# Patient Record
Sex: FEMALE | Race: WHITE | NOT HISPANIC OR LATINO | URBAN - NONMETROPOLITAN AREA
[De-identification: names, ages, dates, MRNs, and addresses within clinical notes are randomized per-mention and may not be internally consistent; named-entity substitution may affect disease eponyms.]

---

## 2017-09-01 PROBLEM — H26.493: Noted: 2017-09-01

## 2019-04-25 ENCOUNTER — IMPORTED ENCOUNTER (OUTPATIENT)
Dept: URBAN - NONMETROPOLITAN AREA CLINIC 1 | Facility: CLINIC | Age: 64
End: 2019-04-25

## 2019-04-25 PROCEDURE — 92012 INTRM OPH EXAM EST PATIENT: CPT

## 2019-04-25 PROCEDURE — 92133 CPTRZD OPH DX IMG PST SGM ON: CPT

## 2019-04-25 NOTE — PATIENT DISCUSSION
COAG-Appears stable. -IOP TODAY STBLE-Continue Lumigan as directed. Stressed importance of compliance. -CONSIDER TX CHANGE FOR OS AFTER VF CHANGESPseudophakic OUMONITOREarly PCO OU-Explained symptoms of advancing PCO. -Continue to monitor for now. Pt will notify us if any new symptoms develop.

## 2019-08-29 ENCOUNTER — IMPORTED ENCOUNTER (OUTPATIENT)
Dept: URBAN - NONMETROPOLITAN AREA CLINIC 1 | Facility: CLINIC | Age: 64
End: 2019-08-29

## 2019-08-29 PROCEDURE — 92012 INTRM OPH EXAM EST PATIENT: CPT

## 2020-02-27 ENCOUNTER — IMPORTED ENCOUNTER (OUTPATIENT)
Dept: URBAN - NONMETROPOLITAN AREA CLINIC 1 | Facility: CLINIC | Age: 65
End: 2020-02-27

## 2020-02-27 PROCEDURE — 92083 EXTENDED VISUAL FIELD XM: CPT

## 2020-02-27 PROCEDURE — 92012 INTRM OPH EXAM EST PATIENT: CPT

## 2020-02-27 NOTE — PATIENT DISCUSSION
COAG-Appears stable. -IOP TODAY STBLE-Continue Lumigan as directed. Stressed importance of compliance.-vf today stable ouPseudophakic OUMONITOREarly PCO OU-Explained symptoms of advancing PCO. -Continue to monitor for now. Pt will notify us if any new symptoms develop.

## 2020-07-02 ENCOUNTER — IMPORTED ENCOUNTER (OUTPATIENT)
Dept: URBAN - NONMETROPOLITAN AREA CLINIC 1 | Facility: CLINIC | Age: 65
End: 2020-07-02

## 2020-07-02 PROCEDURE — 92133 CPTRZD OPH DX IMG PST SGM ON: CPT

## 2020-09-28 ENCOUNTER — IMPORTED ENCOUNTER (OUTPATIENT)
Dept: URBAN - NONMETROPOLITAN AREA CLINIC 1 | Facility: CLINIC | Age: 65
End: 2020-09-28

## 2020-09-28 PROCEDURE — 92012 INTRM OPH EXAM EST PATIENT: CPT

## 2020-09-28 NOTE — PATIENT DISCUSSION
COAG-IOP TODAY STBLE-Continue Lumigan as directed. - Stressed importance of compliance. Pseudophakic OUMONITOR FOR PCOEarly PCO OU-Explained symptoms of advancing PCO. -Continue to monitor for now. Pt will notify us if any new symptoms develop.

## 2021-03-23 ENCOUNTER — IMPORTED ENCOUNTER (OUTPATIENT)
Dept: URBAN - NONMETROPOLITAN AREA CLINIC 1 | Facility: CLINIC | Age: 66
End: 2021-03-23

## 2021-03-23 PROCEDURE — 92012 INTRM OPH EXAM EST PATIENT: CPT

## 2021-03-23 NOTE — PATIENT DISCUSSION
COAG-IOP TODAY STBLE-vf loss os possible to hx of several trauma's-Continue Lumigan as directed. - Stressed importance of compliance. -monitor for iop and vf changesPseudophakic OUMONITOR FOR PCOEarly PCO OU-Explained symptoms of advancing PCO. -Continue to monitor for now. Pt will notify us if any new symptoms develop. ptosis oseducate ptrefer to dr.s martinez for consult

## 2021-04-13 NOTE — PATIENT DISCUSSION
Glasses Rx given. I called patient and she is not looking for occipital nerve block at this time.  She is hoping to discuss further treatment options to decrease frequency of headaches.       Appointment scheduled for 7:30 tomorrow.

## 2021-04-13 NOTE — PATIENT DISCUSSION
The patient was informed that with [address] [facility] for distance, they will need glasses for all near and intermediate activities after surgery. The patient understands there is a possibility they may need an enhancement after surgery. The patient elects [employer] OS, goal of emmetropia.

## 2021-04-29 NOTE — PATIENT DISCUSSION
Patient currently at lab and states no order for UA/UC. Placed order per Dr. Honey Ponce. Reassured patient.

## 2021-04-30 NOTE — PATIENT DISCUSSION
Cardiology RD/tear warning symptoms reviewed. F&F brochure given. Call immediately if increase in floaters, flashes, veil, blur.

## 2021-05-08 NOTE — PATIENT DISCUSSION
Reassured patient. Hydroxyzine Counseling: Patient advised that the medication is sedating and not to drive a car after taking this medication.  Patient informed of potential adverse effects including but not limited to dry mouth, urinary retention, and blurry vision.  The patient verbalized understanding of the proper use and possible adverse effects of hydroxyzine.  All of the patient's questions and concerns were addressed.

## 2021-06-23 ENCOUNTER — IMPORTED ENCOUNTER (OUTPATIENT)
Dept: URBAN - NONMETROPOLITAN AREA CLINIC 1 | Facility: CLINIC | Age: 66
End: 2021-06-23

## 2021-06-23 PROCEDURE — 92012 INTRM OPH EXAM EST PATIENT: CPT

## 2021-08-23 ENCOUNTER — IMPORTED ENCOUNTER (OUTPATIENT)
Dept: URBAN - NONMETROPOLITAN AREA CLINIC 1 | Facility: CLINIC | Age: 66
End: 2021-08-23

## 2021-08-23 PROCEDURE — 92133 CPTRZD OPH DX IMG PST SGM ON: CPT

## 2021-08-23 PROCEDURE — 99213 OFFICE O/P EST LOW 20 MIN: CPT

## 2021-08-23 NOTE — PATIENT DISCUSSION
COAG-IOP TODAY STBLE-vf loss os possible to hx of several trauma's-Continue Lumigan as directed. - Stressed importance of compliance. -OCT TODAY STABLE-monitor for iop and vf changesPseudophakic OUMONITOR FOR PCOEarly PCO OU-Explained symptoms of advancing PCO. -Continue to monitor for now. Pt will notify us if any new symptoms develop. POSSIBLE MAC ISHEMIC SYNDROME OSOCT TODAYEDUCATE PTMONITOR FOR CHANGES

## 2022-02-17 ENCOUNTER — EMERGENCY VISIT (OUTPATIENT)
Dept: RURAL CLINIC 1 | Facility: CLINIC | Age: 67
End: 2022-02-17

## 2022-02-17 DIAGNOSIS — H26.493: ICD-10-CM

## 2022-02-17 DIAGNOSIS — H20.022: ICD-10-CM

## 2022-02-17 DIAGNOSIS — H40.1132: ICD-10-CM

## 2022-02-17 DIAGNOSIS — Z96.1: ICD-10-CM

## 2022-02-17 PROCEDURE — 99213 OFFICE O/P EST LOW 20 MIN: CPT

## 2022-02-17 ASSESSMENT — TONOMETRY
OD_IOP_MMHG: 12
OS_IOP_MMHG: 12

## 2022-02-17 ASSESSMENT — VISUAL ACUITY
OS_CC: 20/100+2
OD_CC: 20/30

## 2022-03-08 ENCOUNTER — FOLLOW UP (OUTPATIENT)
Dept: RURAL CLINIC 1 | Facility: CLINIC | Age: 67
End: 2022-03-08

## 2022-03-08 DIAGNOSIS — H20.022: ICD-10-CM

## 2022-03-08 DIAGNOSIS — Z96.1: ICD-10-CM

## 2022-03-08 DIAGNOSIS — H26.493: ICD-10-CM

## 2022-03-08 DIAGNOSIS — H40.1132: ICD-10-CM

## 2022-03-08 PROCEDURE — 92083 EXTENDED VISUAL FIELD XM: CPT

## 2022-03-08 PROCEDURE — 99213 OFFICE O/P EST LOW 20 MIN: CPT

## 2022-03-08 ASSESSMENT — VISUAL ACUITY
OS_CC: 20/100
OD_CC: 20/30

## 2022-03-08 ASSESSMENT — TONOMETRY
OS_IOP_MMHG: 25
OD_IOP_MMHG: 16

## 2022-04-09 ASSESSMENT — TONOMETRY
OD_IOP_MMHG: 15
OS_IOP_MMHG: 14
OD_IOP_MMHG: 14
OD_IOP_MMHG: 15
OS_IOP_MMHG: 14
OS_IOP_MMHG: 14
OS_IOP_MMHG: 13
OS_IOP_MMHG: 21
OS_IOP_MMHG: 14
OD_IOP_MMHG: 14
OS_IOP_MMHG: 16
OD_IOP_MMHG: 13

## 2022-04-09 ASSESSMENT — VISUAL ACUITY
OS_SC: 20/100
OS_SC: 20/200+
OD_SC: 20/40
OD_SC: 20/40
OS_CC: J2
OS_SC: 20/200
OD_CC: J2
OD_SC: 20/25
OS_SC: 20/100
OS_SC: 20/400
OS_SC: 20/400
OD_SC: 20/30
OD_SC: 20/25-2
OD_SC: 20/25
OD_SC: 20/50

## 2022-07-11 ENCOUNTER — FOLLOW UP (OUTPATIENT)
Dept: RURAL CLINIC 1 | Facility: CLINIC | Age: 67
End: 2022-07-11

## 2022-07-11 DIAGNOSIS — H20.022: ICD-10-CM

## 2022-07-11 DIAGNOSIS — H26.493: ICD-10-CM

## 2022-07-11 DIAGNOSIS — H40.1132: ICD-10-CM

## 2022-07-11 DIAGNOSIS — Z96.1: ICD-10-CM

## 2022-07-11 PROCEDURE — 92133 CPTRZD OPH DX IMG PST SGM ON: CPT

## 2022-07-11 PROCEDURE — 99213 OFFICE O/P EST LOW 20 MIN: CPT

## 2022-07-11 ASSESSMENT — VISUAL ACUITY
OS_CC: 20/100-1
OD_CC: 20/25

## 2022-07-11 ASSESSMENT — TONOMETRY
OS_IOP_MMHG: 22
OD_IOP_MMHG: 16

## 2023-01-13 ENCOUNTER — EMERGENCY VISIT (OUTPATIENT)
Dept: RURAL CLINIC 1 | Facility: CLINIC | Age: 68
End: 2023-01-13

## 2023-01-13 DIAGNOSIS — H35.3132: ICD-10-CM

## 2023-01-13 PROCEDURE — 92014 COMPRE OPH EXAM EST PT 1/>: CPT

## 2023-01-13 PROCEDURE — 92134 CPTRZ OPH DX IMG PST SGM RTA: CPT

## 2023-01-13 ASSESSMENT — VISUAL ACUITY
OD_CC: 20/40
OS_PH: 20/300
OU_CC: 20/25
OU_CC: 20/40
OS_CC: 20/400
OD_PH: 20/20-1

## 2023-01-13 ASSESSMENT — TONOMETRY
OS_IOP_MMHG: 18
OD_IOP_MMHG: 19

## 2023-07-05 ENCOUNTER — FOLLOW UP (OUTPATIENT)
Dept: RURAL CLINIC 1 | Facility: CLINIC | Age: 68
End: 2023-07-05

## 2023-07-05 DIAGNOSIS — H35.3132: ICD-10-CM

## 2023-07-05 DIAGNOSIS — H20.022: ICD-10-CM

## 2023-07-05 DIAGNOSIS — Z96.1: ICD-10-CM

## 2023-07-05 DIAGNOSIS — H40.1132: ICD-10-CM

## 2023-07-05 DIAGNOSIS — H26.493: ICD-10-CM

## 2023-07-05 PROCEDURE — 99214 OFFICE O/P EST MOD 30 MIN: CPT

## 2023-07-05 PROCEDURE — 92083 EXTENDED VISUAL FIELD XM: CPT

## 2023-07-05 ASSESSMENT — VISUAL ACUITY
OS_CC: 20/200
OD_CC: 20/30
OU_CC: 20/30

## 2023-07-05 ASSESSMENT — TONOMETRY
OD_IOP_MMHG: 18
OS_IOP_MMHG: 18

## 2023-11-06 ENCOUNTER — FOLLOW UP (OUTPATIENT)
Dept: RURAL CLINIC 1 | Facility: CLINIC | Age: 68
End: 2023-11-06

## 2023-11-06 DIAGNOSIS — H26.493: ICD-10-CM

## 2023-11-06 DIAGNOSIS — Z96.1: ICD-10-CM

## 2023-11-06 DIAGNOSIS — H40.1132: ICD-10-CM

## 2023-11-06 DIAGNOSIS — H35.3132: ICD-10-CM

## 2023-11-06 DIAGNOSIS — H20.022: ICD-10-CM

## 2023-11-06 PROCEDURE — 92020 GONIOSCOPY: CPT

## 2023-11-06 PROCEDURE — 92014 COMPRE OPH EXAM EST PT 1/>: CPT

## 2023-11-06 ASSESSMENT — VISUAL ACUITY
OU_CC: 20/25
OD_CC: 20/25
OS_CC: 20/30

## 2023-11-06 ASSESSMENT — TONOMETRY
OS_IOP_MMHG: 18
OD_IOP_MMHG: 17

## 2024-03-11 ENCOUNTER — FOLLOW UP (OUTPATIENT)
Dept: RURAL CLINIC 1 | Facility: CLINIC | Age: 69
End: 2024-03-11

## 2024-03-11 DIAGNOSIS — Z96.1: ICD-10-CM

## 2024-03-11 DIAGNOSIS — H26.493: ICD-10-CM

## 2024-03-11 DIAGNOSIS — H20.022: ICD-10-CM

## 2024-03-11 DIAGNOSIS — H35.3132: ICD-10-CM

## 2024-03-11 DIAGNOSIS — H40.1132: ICD-10-CM

## 2024-03-11 PROCEDURE — 92083 EXTENDED VISUAL FIELD XM: CPT

## 2024-03-11 PROCEDURE — 92014 COMPRE OPH EXAM EST PT 1/>: CPT

## 2024-03-11 ASSESSMENT — TONOMETRY
OS_IOP_MMHG: 14
OD_IOP_MMHG: 14

## 2024-03-11 ASSESSMENT — VISUAL ACUITY
OS_SC: 20/400
OD_SC: 20/40

## 2024-03-25 ENCOUNTER — CONSULTATION/EVALUATION (OUTPATIENT)
Dept: RURAL CLINIC 1 | Facility: CLINIC | Age: 69
End: 2024-03-25

## 2024-03-25 DIAGNOSIS — H40.1132: ICD-10-CM

## 2024-03-25 PROCEDURE — 92014 COMPRE OPH EXAM EST PT 1/>: CPT

## 2024-03-25 ASSESSMENT — TONOMETRY
OS_IOP_MMHG: 14
OD_IOP_MMHG: 17

## 2024-03-25 ASSESSMENT — VISUAL ACUITY
OS_CC: 20/400
OD_CC: 20/30-1

## 2024-04-29 ENCOUNTER — FOLLOW UP (OUTPATIENT)
Dept: RURAL CLINIC 1 | Facility: CLINIC | Age: 69
End: 2024-04-29

## 2024-04-29 DIAGNOSIS — H35.363: ICD-10-CM

## 2024-04-29 DIAGNOSIS — H40.1132: ICD-10-CM

## 2024-04-29 DIAGNOSIS — H20.022: ICD-10-CM

## 2024-04-29 PROCEDURE — 99214 OFFICE O/P EST MOD 30 MIN: CPT

## 2024-04-29 PROCEDURE — 92133 CPTRZD OPH DX IMG PST SGM ON: CPT

## 2024-04-29 RX ORDER — KETOROLAC TROMETHAMINE 4 MG/ML: 1 SOLUTION/ DROPS OPHTHALMIC

## 2024-04-29 ASSESSMENT — TONOMETRY
OD_IOP_MMHG: 13
OS_IOP_MMHG: 12

## 2024-04-29 ASSESSMENT — VISUAL ACUITY
OS_CC: 20/200
OD_CC: 20/25

## 2024-08-01 ENCOUNTER — EMERGENCY VISIT (OUTPATIENT)
Dept: RURAL CLINIC 1 | Facility: CLINIC | Age: 69
End: 2024-08-01

## 2024-08-01 DIAGNOSIS — H35.363: ICD-10-CM

## 2024-08-01 DIAGNOSIS — H40.1132: ICD-10-CM

## 2024-08-01 DIAGNOSIS — H20.022: ICD-10-CM

## 2024-08-01 PROCEDURE — 92012 INTRM OPH EXAM EST PATIENT: CPT

## 2024-08-01 ASSESSMENT — TONOMETRY
OD_IOP_MMHG: 19
OS_IOP_MMHG: 19

## 2024-08-01 ASSESSMENT — VISUAL ACUITY: OD_CC: 20/40

## 2024-08-19 ENCOUNTER — FOLLOW UP (OUTPATIENT)
Dept: RURAL CLINIC 1 | Facility: CLINIC | Age: 69
End: 2024-08-19

## 2024-08-19 DIAGNOSIS — H20.022: ICD-10-CM

## 2024-08-19 DIAGNOSIS — H35.363: ICD-10-CM

## 2024-08-19 DIAGNOSIS — H40.1132: ICD-10-CM

## 2024-08-19 PROCEDURE — 92012 INTRM OPH EXAM EST PATIENT: CPT

## 2024-08-19 ASSESSMENT — VISUAL ACUITY: OS_PH: 20/200-1

## 2024-08-19 ASSESSMENT — TONOMETRY
OS_IOP_MMHG: 14
OD_IOP_MMHG: 14

## 2024-10-28 ENCOUNTER — FOLLOW UP (OUTPATIENT)
Dept: RURAL CLINIC 1 | Facility: CLINIC | Age: 69
End: 2024-10-28

## 2024-10-28 RX ORDER — BRIMONIDINE TARTRATE, TIMOLOL MALEATE 2; 5 MG/ML; MG/ML
1 SOLUTION/ DROPS OPHTHALMIC TWICE A DAY
Start: 2024-10-28

## 2024-12-02 ENCOUNTER — FOLLOW UP (OUTPATIENT)
Age: 69
End: 2024-12-02

## 2024-12-02 DIAGNOSIS — H20.022: ICD-10-CM

## 2024-12-02 DIAGNOSIS — H40.1132: ICD-10-CM

## 2024-12-02 PROCEDURE — 99212 OFFICE O/P EST SF 10 MIN: CPT

## 2025-02-10 ENCOUNTER — EMERGENCY VISIT (OUTPATIENT)
Age: 70
End: 2025-02-10

## 2025-02-10 DIAGNOSIS — H20.022: ICD-10-CM

## 2025-02-10 PROCEDURE — 92012 INTRM OPH EXAM EST PATIENT: CPT

## 2025-03-31 ENCOUNTER — FOLLOW UP (OUTPATIENT)
Age: 70
End: 2025-03-31

## 2025-03-31 DIAGNOSIS — H20.022: ICD-10-CM

## 2025-03-31 DIAGNOSIS — H40.1132: ICD-10-CM

## 2025-03-31 PROCEDURE — 92012 INTRM OPH EXAM EST PATIENT: CPT

## 2025-04-28 ENCOUNTER — FOLLOW UP (OUTPATIENT)
Age: 70
End: 2025-04-28

## 2025-04-28 DIAGNOSIS — R51.9: ICD-10-CM

## 2025-04-28 DIAGNOSIS — H40.1132: ICD-10-CM

## 2025-04-28 DIAGNOSIS — H20.022: ICD-10-CM

## 2025-04-28 PROCEDURE — 99213 OFFICE O/P EST LOW 20 MIN: CPT

## 2025-07-07 ENCOUNTER — COMPREHENSIVE EXAM (OUTPATIENT)
Age: 70
End: 2025-07-07

## 2025-07-07 DIAGNOSIS — H40.1132: ICD-10-CM

## 2025-07-07 DIAGNOSIS — H20.022: ICD-10-CM

## 2025-07-07 DIAGNOSIS — H35.363: ICD-10-CM

## 2025-07-07 PROCEDURE — 99214 OFFICE O/P EST MOD 30 MIN: CPT

## 2025-07-07 PROCEDURE — 92083 EXTENDED VISUAL FIELD XM: CPT
